# Patient Record
Sex: FEMALE | Race: WHITE | NOT HISPANIC OR LATINO | Employment: FULL TIME | URBAN - METROPOLITAN AREA
[De-identification: names, ages, dates, MRNs, and addresses within clinical notes are randomized per-mention and may not be internally consistent; named-entity substitution may affect disease eponyms.]

---

## 2017-03-16 DIAGNOSIS — M41.20 SCOLIOSIS (AND KYPHOSCOLIOSIS), IDIOPATHIC: Primary | ICD-10-CM

## 2017-04-07 DIAGNOSIS — M41.20 SCOLIOSIS (AND KYPHOSCOLIOSIS), IDIOPATHIC: Primary | ICD-10-CM

## 2017-04-19 DIAGNOSIS — M41.20 SCOLIOSIS (AND KYPHOSCOLIOSIS), IDIOPATHIC: Primary | ICD-10-CM

## 2017-05-30 RX ORDER — ESTRADIOL 0.1 MG/G
CREAM VAGINAL
Refills: 2 | COMMUNITY
Start: 2017-03-02

## 2017-05-30 RX ORDER — ZOLPIDEM TARTRATE 10 MG/1
TABLET ORAL
Refills: 0 | COMMUNITY
Start: 2017-03-21

## 2017-05-31 ENCOUNTER — OFFICE VISIT (OUTPATIENT)
Dept: NEUROSURGERY | Facility: CLINIC | Age: 57
End: 2017-05-31

## 2017-05-31 ENCOUNTER — HOSPITAL ENCOUNTER (OUTPATIENT)
Dept: GENERAL RADIOLOGY | Facility: HOSPITAL | Age: 57
Discharge: HOME | End: 2017-05-31
Admitting: PHYSICIAN ASSISTANT

## 2017-05-31 VITALS
DIASTOLIC BLOOD PRESSURE: 74 MMHG | HEIGHT: 67 IN | BODY MASS INDEX: 25.9 KG/M2 | SYSTOLIC BLOOD PRESSURE: 108 MMHG | TEMPERATURE: 97.6 F | WEIGHT: 165 LBS

## 2017-05-31 DIAGNOSIS — M41.20 SCOLIOSIS (AND KYPHOSCOLIOSIS), IDIOPATHIC: ICD-10-CM

## 2017-05-31 DIAGNOSIS — M41.20 SCOLIOSIS (AND KYPHOSCOLIOSIS), IDIOPATHIC: Primary | ICD-10-CM

## 2017-05-31 DIAGNOSIS — M47.817 LUMBOSACRAL SPONDYLOSIS WITHOUT MYELOPATHY: ICD-10-CM

## 2017-05-31 DIAGNOSIS — M54.50 CHRONIC BILATERAL LOW BACK PAIN WITHOUT SCIATICA: ICD-10-CM

## 2017-05-31 DIAGNOSIS — G89.29 CHRONIC BILATERAL LOW BACK PAIN WITHOUT SCIATICA: ICD-10-CM

## 2017-05-31 PROCEDURE — 72082 X-RAY EXAM ENTIRE SPI 2/3 VW: CPT

## 2017-05-31 PROCEDURE — 99213 OFFICE O/P EST LOW 20 MIN: CPT | Performed by: NEUROLOGICAL SURGERY

## 2017-05-31 RX ORDER — NAPROXEN SODIUM 220 MG
220 TABLET ORAL
COMMUNITY

## 2018-05-01 ENCOUNTER — TELEPHONE (OUTPATIENT)
Dept: NEUROSURGERY | Facility: CLINIC | Age: 58
End: 2018-05-01

## 2018-05-01 DIAGNOSIS — M41.20 SCOLIOSIS (AND KYPHOSCOLIOSIS), IDIOPATHIC: Primary | ICD-10-CM

## 2018-05-01 NOTE — TELEPHONE ENCOUNTER
Please pend scoliosis xray order for signing.  Pt will get those done same day as appointment with Dr. Stevens.

## 2018-06-06 ENCOUNTER — OFFICE VISIT (OUTPATIENT)
Dept: NEUROSURGERY | Facility: CLINIC | Age: 58
End: 2018-06-06

## 2018-06-06 ENCOUNTER — HOSPITAL ENCOUNTER (OUTPATIENT)
Dept: GENERAL RADIOLOGY | Facility: HOSPITAL | Age: 58
Discharge: HOME | End: 2018-06-06
Attending: NEUROLOGICAL SURGERY | Admitting: NEUROLOGICAL SURGERY

## 2018-06-06 VITALS
DIASTOLIC BLOOD PRESSURE: 62 MMHG | OXYGEN SATURATION: 94 % | SYSTOLIC BLOOD PRESSURE: 110 MMHG | HEART RATE: 72 BPM | HEIGHT: 67 IN | WEIGHT: 156 LBS | TEMPERATURE: 98.8 F | BODY MASS INDEX: 24.48 KG/M2

## 2018-06-06 DIAGNOSIS — G89.29 CHRONIC BILATERAL LOW BACK PAIN WITHOUT SCIATICA: ICD-10-CM

## 2018-06-06 DIAGNOSIS — M41.20 SCOLIOSIS (AND KYPHOSCOLIOSIS), IDIOPATHIC: Primary | ICD-10-CM

## 2018-06-06 DIAGNOSIS — M47.817 LUMBOSACRAL SPONDYLOSIS WITHOUT MYELOPATHY: ICD-10-CM

## 2018-06-06 DIAGNOSIS — M54.50 CHRONIC BILATERAL LOW BACK PAIN WITHOUT SCIATICA: ICD-10-CM

## 2018-06-06 PROCEDURE — 72082 X-RAY EXAM ENTIRE SPI 2/3 VW: CPT

## 2018-06-06 PROCEDURE — 99213 OFFICE O/P EST LOW 20 MIN: CPT | Performed by: NEUROLOGICAL SURGERY

## 2018-06-06 NOTE — PROGRESS NOTES
Patient: Olayinka House  :  1960  Chart #:  [id number]    Date of Service: 18    Chief Complaint:   Chief Complaint   Patient presents with   • Back Pain       Back Pain   Chronicity: Mrs. House returns today for a follow-up on her back pain. Episode onset: On 11 she had a L2-L4 lumbar fusion. The problem occurs rarely (There is no amount of exercise however that would reduce the hump in her thoracic region.). The problem has been gradually improving since onset. The pain is present in the lumbar spine (Patient states that she works out all the time.). The quality of the pain is described as aching. The pain does not radiate. The pain is at a severity of 0/10. The patient is experiencing no pain. The symptoms are aggravated by position. Stiffness is present in the morning. Risk factors include history of osteoporosis and sedentary lifestyle. She has tried NSAIDs, bed rest and heat (She takes Aleve for her pain.) for the symptoms. The treatment provided significant relief.       Radiographic Images:   Scoliosis x-ray dated 18 shows she has a SVA of 1.8 cm.  She has a right thoracic scoliosis with the treviño angle of 50 degrees.  She is a s/p L2-L4 posterior fusion with interbody cages and pedicle screw fixation.  Her back and instrumentation looks good.    Past Medical History:   Diagnosis Date   • Arthritis    • Asthma    • Osteoporosis      Current Outpatient Prescriptions   Medication Sig Dispense Refill   • ESTRACE VAGINAL 0.1 MG/GM vaginal cream   2   • naproxen sodium (ALEVE) 220 MG tablet Take 220 mg by mouth every night at bedtime.     • PROAIR  (90 BASE) MCG/ACT inhaler   5   • zolpidem (AMBIEN) 10 MG tablet   0     No current facility-administered medications for this visit.       Allergies   Allergen Reactions   • Penicillins Hives     Problems as a child, but not as an adult     Social History     Social History   • Marital status:      Social History Main Topics  "  • Smoking status: Never Smoker   • Smokeless tobacco: Never Used   • Alcohol use No   • Drug use: No   • Sexual activity: Defer     Other Topics Concern   • Not on file     Family History   Problem Relation Age of Onset   • No Known Problems Mother    • No Known Problems Father      Past Surgical History:   Procedure Laterality Date   • BACK SURGERY  05/2011   • BLADDER SURGERY  07/2013     Review of Systems   Musculoskeletal: Positive for back pain.   All other systems reviewed and are negative.    Vitals:    06/06/18 1003   BP: 110/62   BP Location: Right arm   Patient Position: Sitting   Pulse: 72   Temp: 98.8 °F (37.1 °C)   TempSrc: Temporal Artery    SpO2: 94%   Weight: 70.8 kg (156 lb)   Height: 170.2 cm (67.01\")     Physical Exam  Neurologic Exam  Constitutional: She is oriented to person, place, and time. She appears well-developed and well-nourished. No distress.   Neat healthy female   Cardiovascular: Regular rhythm.    Pulses:       Carotid pulses are 2+ on the right side, and 2+ on the left side.       Radial pulses are 2+ on the right side, and 2+ on the left side.        Dorsalis pedis pulses are 2+ on the right side, and 2+ on the left side.   Musculoskeletal:        Thoracic back: She exhibits deformity. She exhibits no pain.        Lumbar back: She exhibits deformity. She exhibits normal range of motion and no pain.   R thoracic scoliosis and L lumbar compensatory curve;  Minimal stiffness;  SLR negative;  Hip ROM normal   Healed lumbar incision.     Neurologic Exam      Mental Status   Oriented to person, place, and time.   Attention: normal. Concentration: normal.   Speech: speech is normal   Level of consciousness: alert  Knowledge: good and consistent with education.   Normal comprehension.      Cranial Nerves   Cranial nerves II through XII intact.      Motor Exam   Muscle bulk: normal  Overall muscle tone: normal     Strength   Strength 5/5 throughout.      Sensory Exam   Light touch normal. "   Proprioception normal.      Gait, Coordination, and Reflexes      Gait: normal     Tremor   Resting tremor: absent  Intention tremor: absent  Action tremor: absent     Reflexes   Right biceps: 1+  Left biceps: 1+  Right triceps: 1+  Left triceps: 1+  Right patellar: 1+  Left patellar: 1+  Right achilles: 0  Left achilles: 0  Right Salcedo: absent  Left Salcedo: absent  Right ankle clonus: absent  Left ankle clonus: absent       PAOLO normal      Olayinka was seen today for back pain.    Diagnoses and all orders for this visit:    Scoliosis (and kyphoscoliosis), idiopathic    Lumbosacral spondylosis without myelopathy    Chronic bilateral low back pain without sciatica      Plan:  Continue daily exercise;  Avoid abuse of the back;  I do not recommend any surgery at this time.  I have discussed this with the patient.  She is to f/u again in 1 year for routine evaluation.      I, Dr. Roque Stevens, personally performed the services described in the documentation as scribed in my presence, and the documentation is both accurate and complete.    Roque Stevens MD